# Patient Record
Sex: FEMALE | Race: WHITE
[De-identification: names, ages, dates, MRNs, and addresses within clinical notes are randomized per-mention and may not be internally consistent; named-entity substitution may affect disease eponyms.]

---

## 2017-03-28 ENCOUNTER — HOSPITAL ENCOUNTER (OUTPATIENT)
Dept: HOSPITAL 80 - FIMAGING | Age: 57
End: 2017-03-28
Attending: GENERAL ACUTE CARE HOSPITAL
Payer: COMMERCIAL

## 2017-03-28 DIAGNOSIS — D64.9: ICD-10-CM

## 2017-03-28 DIAGNOSIS — I95.9: ICD-10-CM

## 2017-03-28 DIAGNOSIS — R55: ICD-10-CM

## 2017-03-28 DIAGNOSIS — Z12.31: Primary | ICD-10-CM

## 2017-03-28 DIAGNOSIS — Z53.8: ICD-10-CM

## 2017-03-28 PROCEDURE — G0202 SCR MAMMO BI INCL CAD: HCPCS

## 2017-03-28 NOTE — PDGENHP
History and Physical





- Chief Complaint


 acute loss of consciousness





- History of Present Illness


 primary care provider:  Dr. Rodríguez





HPI:  57-year-old female presenting with acute loss of consciousness 

characterized as sudden, with precipitating associated lightheadedness and 

feeling of weakness while she was undergoing mammography.  The patient had not 

recently been repositioned and did not experience any trauma she was gently 

lowered to the floor by the mammography tach.  Loss of consciousness lasted 

only several seconds and she regained consciousness without any intervention.  

The onset was at approximately 1:30 p.m. on 3/28/2017.  Duration was only 

several seconds.  She did not experience any postictal symptoms, no nausea, no 

vomiting, patient reports that she ate breakfast on the morning of presentation 

without any incidence.  Although the patient reports that she has had similar 

episodes in the past, her  denies these.  Patient reports he had 

otherwise been feeling well on the day prior to this presentation and denies 

any other symptoms.





Of note, the patient's systolic blood pressure was 130 and her heart rate was 

in the 80s when vital signs were checked several minutes after the episode.





History Information


I have personally reviewed and updated: family history, medical history, social 

history, surgical history





- Past Medical History


CVA ( October of 2013 with MRI demonstrating basal ganglia and cerebellar 

infarct, residual deficits include dysarthria and poor insight into deficits), 

hyperlipidemia, pulmonary embolism (  Unknown date unknown treatment)


Additional medical history: Reportedly PEA arrest the patient does not 

elucidate.  Reports of anoxic encephalopathy.  Uterine cancer





- Surgical History


Additional surgical history: hysterectomy





- Family History


Additional family history: patient's mother lived to be 90





- Social History


Smoking Status: Never smoked


Alcohol Use: None


Drug Use: None


Additional social history: sedentary at baseline, splits time between [employer] and [employer], retired  at [employer]





Review of Systems


ROS: 10pt was reviewed & negative except for what was stated in HPI & below


Neurological: Reports: weakness, other ( loss of consciousness)





Physical Exam


Constitutional: no apparent distress, appears nourished, not in pain


Eyes: PERRL, anicteric sclera, EOMI


Ears, Nose, Mouth, Throat: moist mucous membranes, hearing normal, ears appear 

normal, no oral mucosal ulcers


Cardiovascular: regular rate and rhythym, no murmur, rub, or gallop, No carotid 

bruit, No edema


Respiratory: no respiratory distress, no rales or rhonchi, clear to auscultation


Gastrointestinal: normoactive bowel sounds, soft, non-tender abdomen, no 

palpable masses


Neurologic: AAOx3, sensation intact bilaterally, other ( notable dysarthria), 

No weakness ( motor strength 5/5 bilateral upper and lower extremities)


Psychiatric: interacting appropriately, not anxious, not encephalopathic, 

thought process linear





Assessment & Plan


Assessment: 





 57-year-old female experiencing acute syncopal episode, stat team called in 

mammography.


Plan: 





1. Syncope.  Acute, new problem this provider, further workup is suggested.  

The patient experienced a syncopal episode and experienced recurrence of 

symptoms with a systolic blood pressure of 78.  I suspect the patient is either 

orthostatic or experiencing some other specific etiology of her hypotension.  

That being said, the patient's systolic blood pressure was 130 with a heart 

rate of 80 on my physical exam of the patient, suggesting that her symptoms are 

mostly orthostatic and she may simply be hypovolemic.  That being said, the 

patient should receive formal evaluation in the emergency department and this 

has been recommended to the patient and her .


- heart monitor rhythm reviewed, demonstrates a normal sinus mechanism, 

personally interpreted


- reviewed outside records including 8/18/2014 SLP report by Traci Gonzalez, 

reports the patient has poor insight into her deficits and this may be somewhat 

influencing her present decision making


- most recent lab work from 1/7/2017 demonstrating normocytic anemia with 

slight iron deficiency notably saturation of 11%, TIBC 412, iron level 44, 

suggesting the patient may have a slow insidious GI bleed contributing to her 

anemia verses an oral iron deficient component


- if the patient does report to the emergency department, would recommend 

observation overnight after orthostatics have formally been performed, she has 

received IV fluids, she has received a formal EKG, and she has had lab work to 

ensure that she does not have any other insidious pathologies such as acute 

kidney injury, myocardial ischemia, worsening anemia, lactic acidosis


-if the patient and her  choose not to go to the emergency department, 

would recommend immediate follow-up at Dr. Rodríguez office on the afternoon of 

this event





It should be noted that this consultation was performed in the setting of 

responding to a stat team call while patient was in outpatient mammography.

## 2017-06-07 ENCOUNTER — HOSPITAL ENCOUNTER (OUTPATIENT)
Dept: HOSPITAL 80 - FIMAGING | Age: 57
End: 2017-06-07
Attending: SURGERY
Payer: COMMERCIAL

## 2017-06-07 DIAGNOSIS — C18.9: Primary | ICD-10-CM

## 2017-06-07 DIAGNOSIS — K80.20: ICD-10-CM

## 2017-06-07 DIAGNOSIS — Q63.1: ICD-10-CM

## 2017-06-14 ENCOUNTER — HOSPITAL ENCOUNTER (INPATIENT)
Dept: HOSPITAL 80 - F3N | Age: 57
LOS: 10 days | Discharge: HOME | DRG: 330 | End: 2017-06-24
Attending: SURGERY | Admitting: SURGERY
Payer: COMMERCIAL

## 2017-06-14 DIAGNOSIS — Z79.01: ICD-10-CM

## 2017-06-14 DIAGNOSIS — Z85.42: ICD-10-CM

## 2017-06-14 DIAGNOSIS — K91.89: ICD-10-CM

## 2017-06-14 DIAGNOSIS — D63.8: ICD-10-CM

## 2017-06-14 DIAGNOSIS — Z53.31: ICD-10-CM

## 2017-06-14 DIAGNOSIS — D62: ICD-10-CM

## 2017-06-14 DIAGNOSIS — C18.5: Primary | ICD-10-CM

## 2017-06-14 DIAGNOSIS — Z86.718: ICD-10-CM

## 2017-06-14 PROCEDURE — 0DTG0ZZ RESECTION OF LEFT LARGE INTESTINE, OPEN APPROACH: ICD-10-PCS | Performed by: SURGERY

## 2017-06-14 PROCEDURE — 0DJD8ZZ INSPECTION OF LOWER INTESTINAL TRACT, VIA NATURAL OR ARTIFICIAL OPENING ENDOSCOPIC: ICD-10-PCS | Performed by: SURGERY

## 2017-06-14 PROCEDURE — P9016 RBC LEUKOCYTES REDUCED: HCPCS

## 2017-06-14 RX ADMIN — SODIUM CHLORIDE, SODIUM LACTATE, POTASSIUM CHLORIDE, AND CALCIUM CHLORIDE SCH MLS: 600; 310; 30; 20 INJECTION, SOLUTION INTRAVENOUS at 16:03

## 2017-06-14 RX ADMIN — SODIUM CHLORIDE, SODIUM LACTATE, POTASSIUM CHLORIDE, AND CALCIUM CHLORIDE SCH MLS: 600; 310; 30; 20 INJECTION, SOLUTION INTRAVENOUS at 23:22

## 2017-06-14 NOTE — POSTOPPROG
Post Op Note


Date of Operation: 06/14/17


Surgeon: Rubio Schmidt


Assistant: none


Anesthesiologist: Denis


Anesthesia: GET(General Endotracheal)


Pre-op Diagnosis: Splenic flexure cancer


Post-op Diagnosis: same


Procedure: left colectomy


Findings: large splenic mass, air tight anastomosis


Inf/Abcess present in the surg proc area at time of surgery?: No


EBL: 


Complications: 





none


Specimen(s): 





left colon proximal end stapled

## 2017-06-14 NOTE — PDANEPAE
ANE History of Present Illness





colon ca





ANE Past Medical History





- Cardiovascular History


Hx Hypertension: No


Hx Arrhythmias: No


Hx Chest Pain: No


Hx Coronary Artery / Peripheral Vascular Disease: No


Hx CHF / Valvular Disease: No


Hx Palpitations: No





- Pulmonary History


Hx COPD: No


Hx Asthma/Reactive Airway Disease: No


Hx Recent Upper Respiratory Infection: No


Hx Oxygen in Use at Home: No





- Neurologic History


Hx Cerebrovascular Accident: Yes


Hx Seizures: No


Hx Dementia: No





- Endocrine History


Hx Diabetes: No





- Renal History


Hx Renal Disorders: No





- Liver History


Hx Hepatic Disorders: No





- Neurological & Psychiatric Hx


Hx Neurological and Psychiatric Disorders: No





- Cancer History


Hx Cancer: Yes





- Congenital Disorder History


Hx Congenital Disorders: No





- GI History


Hx Gastrointestinal Disorders: Yes





- Chronic Pain History


Chronic Pain: No





ANE Review of Systems





- Exercise capacity


METS (RN): 4 METS





- Systems


Neurological: Reports: other (cva/trach-speech impediment, otherwise health)


Hematologic/Lymphatic: Reports: blood clots (dvt/pe/cva)





ANE Patient History





- Allergies


Allergies/Adverse Reactions: 








No Known Allergies Allergy (Verified 06/13/17 17:12)


 








- Home Medications


Home Medications: 








Rivaroxaban [Xarelto] 20 mg PO DAILY18 06/12/17 [Last Taken 06/11/17 21:00]








- NPO status


NPO Since - Liquids (Date): 06/13/17


NPO Since - Liquids (Time): 23:30


NPO Since - Solids (Date): 06/13/17


NPO Since - Solids (Time): 08:00





- Anes Hx


Anes Hx: no prior problems





- Smoking Hx


Smoking Status: Never smoked





ANE Labs/Vital Signs





- Labs - CBC


RBC: 34





- Labs - BMP


Sodium: bmp reviewed and ok





- Vital Signs


Blood Pressure: 130/93


Heart Rate: 93


Respiratory Rate: 16


O2 Sat (%): 93


Height: 170.18 cm


Weight: 81.647 kg





ANE Physical Exam





- Airway


Neck exam: decreased ROM


Mallampati Score: Class 2


Mouth exam: normal dental/mouth exam





- Pulmonary


Pulmonary: no respiratory distress





- Cardiovascular


Cardiovascular: regular rate and rhythym





- ASA Status


ASA Status: II





ANE Anesthesia Plan


Anesthesia Plan: general endotracheal anesthesia

## 2017-06-14 NOTE — POSTANESTH
Post Anesthetic Evaluation


Cardiovascular Status: Normal, Stable


Respiratory Status: Normal, Stable


Level of Consciousness/Mental Status: Can Participate in Eval


Pain Control: Adequate, Prn Tx Ordered


Nausea/Vomiting Control: Adequate, Prn Tx Ordered


Complications Possibly Related to Anesthesia: None Noted

## 2017-06-15 LAB
% IMMATURE GRANULYOCYTES: 0.5 % (ref 0–1.1)
ABSOLUTE IMMATURE GRANULOCYTES: 0.06 10^3/UL (ref 0–0.1)
ABSOLUTE NRBC COUNT: 0 10^3/UL (ref 0–0.01)
ADD DIFF?: NO
ADD MORPH?: NO
ADD SCAN?: NO
ANION GAP SERPL CALC-SCNC: 5 MEQ/L (ref 8–16)
ATYPICAL LYMPHOCYTE FLAG: 0 (ref 0–99)
CALCIUM SERPL-MCNC: 8.4 MG/DL (ref 8.5–10.4)
CHLORIDE SERPL-SCNC: 107 MEQ/L (ref 97–110)
CO2 SERPL-SCNC: 24 MEQ/L (ref 22–31)
CREAT SERPL-MCNC: 0.8 MG/DL (ref 0.6–1)
ERYTHROCYTE [DISTWIDTH] IN BLOOD BY AUTOMATED COUNT: 16.3 % (ref 11.5–15.2)
FRAGMENT RBC FLAG: 20 (ref 0–99)
GFR SERPL CREATININE-BSD FRML MDRD: > 60 ML/MIN/{1.73_M2}
GLUCOSE SERPL-MCNC: 124 MG/DL (ref 70–100)
HCT VFR BLD CALC: 25.1 % (ref 38–47)
HCT VFR BLD CALC: 29 % (ref 38–47)
HGB BLD-MCNC: 7.8 G/DL (ref 12.6–16.3)
HGB BLD-MCNC: 9.3 G/DL (ref 12.6–16.3)
LEFT SHIFT FLG: 20 (ref 0–99)
LIPEMIA HEMOLYSIS FLAG: 80 (ref 0–99)
MCH RBC BLDCO QN: 22.2 PG (ref 27.9–34.1)
MCHC RBC AUTO-ENTMCNC: 31.1 G/DL (ref 32.4–36.7)
MCV RBC AUTO: 71.3 FL (ref 81.5–99.8)
NRBC-AUTO%: 0 % (ref 0–0.2)
PLATELET # BLD: 433 10^3/UL (ref 150–400)
PLATELET CLUMPS FLAG: 0 (ref 0–99)
PMV BLD AUTO: 9.3 FL (ref 8.7–11.7)
POTASSIUM SERPL-SCNC: 3.7 MEQ/L (ref 3.5–5.2)
RBC # BLD AUTO: 3.52 10^6/UL (ref 4.18–5.33)
SODIUM SERPL-SCNC: 136 MEQ/L (ref 134–144)
TROPONIN I SERPL-MCNC: < 0.012 NG/ML (ref 0–0.03)

## 2017-06-15 PROCEDURE — 30233N1 TRANSFUSION OF NONAUTOLOGOUS RED BLOOD CELLS INTO PERIPHERAL VEIN, PERCUTANEOUS APPROACH: ICD-10-PCS | Performed by: SURGERY

## 2017-06-15 RX ADMIN — SODIUM CHLORIDE, SODIUM LACTATE, POTASSIUM CHLORIDE, AND CALCIUM CHLORIDE SCH MLS: 600; 310; 30; 20 INJECTION, SOLUTION INTRAVENOUS at 20:52

## 2017-06-15 RX ADMIN — HEPARIN SODIUM SCH UNIT: 5000 INJECTION, SOLUTION INTRAVENOUS; SUBCUTANEOUS at 20:52

## 2017-06-15 RX ADMIN — SODIUM FERRIC GLUCONATE COMPLEX SCH MLS: 12.5 INJECTION INTRAVENOUS at 15:44

## 2017-06-15 RX ADMIN — HEPARIN SODIUM SCH UNIT: 5000 INJECTION, SOLUTION INTRAVENOUS; SUBCUTANEOUS at 15:44

## 2017-06-15 RX ADMIN — SODIUM CHLORIDE, SODIUM LACTATE, POTASSIUM CHLORIDE, AND CALCIUM CHLORIDE SCH MLS: 600; 310; 30; 20 INJECTION, SOLUTION INTRAVENOUS at 06:42

## 2017-06-15 RX ADMIN — HEPARIN SODIUM SCH UNIT: 5000 INJECTION, SOLUTION INTRAVENOUS; SUBCUTANEOUS at 06:41

## 2017-06-15 NOTE — GCON
[f rep st]



                                                                    CONSULTATION





INTERNAL MEDICINE CONSULTATION



DATE OF CONSULTATION:  06/14/2017



REFERRING PHYSICIAN:  Rubio Schmidt MD





REASON FOR CONSULTATION:  History of PE, chest pain.



HISTORY OF PRESENT ILLNESS:  This is a 57-year-old female who underwent a partial colectomy yesterda
y for a large splenic flexure mass that was adenocarcinoma.  This was found from routine screening c
olonoscopy.  She does have probable iron deficiency anemia.  She has a history of a massive PE resul
ting in cardiac arrest and a 3 week hospital stay in [place].  At that time, she was found to have a
 uterine mass and uterine cancer, and that was resected later on.  She has been on Xarelto for antic
oagulation.  This was discontinued several days prior to surgery.  This morning, she had some chest 
pain, but it is now resolved.  She is denying any shortness of breath at this time.  She has a littl
e bit of throat irritation.  She does also complain of abdominal distention.  No flatus today.



REVIEW OF SYSTEMS:  10-point review of systems obtained, other than stated above is negative.



PAST MEDICAL HISTORY:  

1.  Uterine cancer.

2.  History of PE and DVT.



PAST SURGICAL HISTORY:  Knee surgery, tracheostomy, PEG tube, hysterectomy.



FAMILY HISTORY:  Mother with hypertension.



SOCIAL HISTORY:  No smoking.  Occasional alcohol.



PHYSICAL EXAMINATION:  VITAL SIGNS:  Afebrile, blood pressure is 117/73, heart rate 80, oxygen satur
ation is at 97% on 1 L.  GENERAL:  Patient is well developed, no apparent distress.  HEENT:  Nonicte
cindy sclerae.  Extraocular movements intact.  Moist mucous membranes.  NECK:  Supple.  A little bit o
f fullness in the thyroid area.  LUNGS:  Good effort.  Clear to auscultation bilaterally.  CARDIOVAS
CULAR:  Regular rate and rhythm.  No murmurs, gallops.  ABDOMEN:  Distended.  Midline incision noted
.  Decreased bowel sounds.  Soft.  Mild tenderness.  EXTREMITIES:  No clubbing, cyanosis, or edema. 
 SKIN:  Without rash, warm, intact.  NEUROLOGIC:  Awake, alert, and oriented x3, moving all 4 extrem
ities equally.  PSYCH:  Normal mood and affect.



LABORATORIES:  White blood cell count is 12 hemoglobin 7.8 with a preoperative hemoglobin of 10.5.  
There is a microcytic anemia.  She had a ferritin of 6 in January of this year.  CEA is 3.  Troponin
 negative today.  



EKG personally reviewed and interpreted showed normal sinus rhythm, no ischemic changes.



ASSESSMENT:  This is a 57-year-old female who is status post partial colectomy due to colon cancer a
nd a previous history of massive pulmonary embolism and deep venous thrombosis.



PLAN:  

1.  Colon cancer.  This has been resected.  Further management per Surgery.

2.  Chest pain.  This has resolved, and EKG is negative.  Troponin is negative as well.  I think thi
s is unlikely to be a PE as her symptoms have resolved and she has not been off her anticoagulation 
that long.  We will continue to monitor.

3.  History of PE and DVT.  It looks like she has been started on subcu heparin today with a plan of
 starting Xarelto tonight.  I will discuss it with Dr. Schmidt but probably prefer maybe a day of fu
ll dose Lovenox prior as this is completely metabolized within 12 hours in case there is some bleedi
ng complication.  It does take longer for Xarelto it seems to come out of the system.  She does have
 normal creatinine.

4.  Iron deficiency anemia.  We will give a few doses of IV iron while she is here in the hospital. 
 It appears that she has been written for a little bit of blood.  



Thank you for this consultation.  Will follow with you.





Job #:  [id number]

## 2017-06-15 NOTE — CPEKG
Heart Rate: 80

RR Interval: 750

P-R Interval: 156

QRSD Interval: 100

QT Interval: 364

QTC Interval: 420

P Axis: 63

QRS Axis: 39

T Wave Axis: 19

EKG Severity - BORDERLINE ECG -

EKG Impression: SINUS RHYTHM

Electronically Signed By: Jamil Hector 15-Storm-2017 08:55:21

## 2017-06-15 NOTE — SOAPPROG
SOAP Progress Note


Assessment/Plan: 


Assessment/Plan:


POD#1 c/o chest pain this am.  EKG normal.  troponin nl.  Hx of embolus Heparin 

started, Xarelto ordered for 6/15


VSS


Hgb/Hct 7.8/24 from 10/31


Min OR blood loss


Abd distended likely post op acute blood loss anemia


2 units pRBC ordered.


Cont clear liq


Medicine consult


Will see later today


H/H after transfusion








06/15/17 09:11





Objective: 





 Vital Signs











Temp Pulse Resp BP Pulse Ox


 


 36.2 C   90   18   116/71   94 


 


 06/15/17 07:57  06/15/17 07:57  06/15/17 07:57  06/15/17 07:57  06/15/17 07:57








 Laboratory Results





 06/15/17 07:30 





 06/15/17 07:30 





 











 06/14/17 06/15/17 06/16/17





 05:59 05:59 05:59


 


Intake Total  2900 


 


Output Total  650 


 


Balance  2250 














ICD10 Worksheet


Patient Problems: 


 Problems











Problem Status Onset


 


Colon adenocarcinoma Acute  














- ICD10 Problem Qualifiers


(1) Colon adenocarcinoma

## 2017-06-16 LAB
% IMMATURE GRANULYOCYTES: 0.5 % (ref 0–1.1)
ABSOLUTE IMMATURE GRANULOCYTES: 0.06 10^3/UL (ref 0–0.1)
ABSOLUTE NRBC COUNT: 0 10^3/UL (ref 0–0.01)
ADD DIFF?: NO
ADD MORPH?: NO
ADD SCAN?: NO
ALBUMIN SERPL-MCNC: 2.4 G/DL (ref 3.5–5)
ALP SERPL-CCNC: 51 IU/L (ref 38–126)
ALT SERPL-CCNC: 25 IU/L (ref 9–52)
ANION GAP SERPL CALC-SCNC: 3 MEQ/L (ref 8–16)
AST SERPL-CCNC: 24 IU/L (ref 14–46)
ATYPICAL LYMPHOCYTE FLAG: 0 (ref 0–99)
BILIRUB SERPL-MCNC: 2 MG/DL (ref 0.1–1.4)
CALCIUM SERPL-MCNC: 8.2 MG/DL (ref 8.5–10.4)
CHLORIDE SERPL-SCNC: 107 MEQ/L (ref 97–110)
CO2 SERPL-SCNC: 24 MEQ/L (ref 22–31)
CREAT SERPL-MCNC: 0.7 MG/DL (ref 0.6–1)
ERYTHROCYTE [DISTWIDTH] IN BLOOD BY AUTOMATED COUNT: 17 % (ref 11.5–15.2)
FRAGMENT RBC FLAG: 0 (ref 0–99)
GFR SERPL CREATININE-BSD FRML MDRD: > 60 ML/MIN/{1.73_M2}
GLUCOSE SERPL-MCNC: 101 MG/DL (ref 70–100)
HCT VFR BLD CALC: 28.6 % (ref 38–47)
HGB BLD-MCNC: 9.1 G/DL (ref 12.6–16.3)
LEFT SHIFT FLG: 0 (ref 0–99)
LIPEMIA HEMOLYSIS FLAG: 80 (ref 0–99)
MCH RBC BLDCO QN: 23.5 PG (ref 27.9–34.1)
MCHC RBC AUTO-ENTMCNC: 31.8 G/DL (ref 32.4–36.7)
MCV RBC AUTO: 73.7 FL (ref 81.5–99.8)
NRBC-AUTO%: 0 % (ref 0–0.2)
PLATELET # BLD: 328 10^3/UL (ref 150–400)
PLATELET CLUMPS FLAG: 0 (ref 0–99)
PMV BLD AUTO: 9.1 FL (ref 8.7–11.7)
POTASSIUM SERPL-SCNC: 3.7 MEQ/L (ref 3.5–5.2)
PROT SERPL-MCNC: 4.4 G/DL (ref 6.3–8.2)
RBC # BLD AUTO: 3.88 10^6/UL (ref 4.18–5.33)
SODIUM SERPL-SCNC: 134 MEQ/L (ref 134–144)

## 2017-06-16 RX ADMIN — HEPARIN SODIUM SCH UNIT: 5000 INJECTION, SOLUTION INTRAVENOUS; SUBCUTANEOUS at 05:25

## 2017-06-16 RX ADMIN — RIVAROXABAN SCH MG: 20 TABLET, FILM COATED ORAL at 17:52

## 2017-06-16 RX ADMIN — SODIUM CHLORIDE, SODIUM LACTATE, POTASSIUM CHLORIDE, AND CALCIUM CHLORIDE SCH MLS: 600; 310; 30; 20 INJECTION, SOLUTION INTRAVENOUS at 14:47

## 2017-06-16 RX ADMIN — ACETAMINOPHEN PRN MG: 325 TABLET ORAL at 18:26

## 2017-06-16 RX ADMIN — HEPARIN SODIUM SCH UNIT: 5000 INJECTION, SOLUTION INTRAVENOUS; SUBCUTANEOUS at 13:41

## 2017-06-16 RX ADMIN — SODIUM FERRIC GLUCONATE COMPLEX SCH MLS: 12.5 INJECTION INTRAVENOUS at 08:55

## 2017-06-16 RX ADMIN — SODIUM CHLORIDE, SODIUM LACTATE, POTASSIUM CHLORIDE, AND CALCIUM CHLORIDE SCH MLS: 600; 310; 30; 20 INJECTION, SOLUTION INTRAVENOUS at 23:01

## 2017-06-16 NOTE — HOSPPROG
Hospitalist Progress Note


Assessment/Plan: 





* colon cancer status post resection


* Doing well


* Diet will be advanced


* Possibly home tomorrow





* chest pain


* Troponins are negative


* Chest pain is resolved


* Possibly musculoskeletal or GERD





* history of massive PE


* Restart Xarelto tonight





* iron deficiency anemia/acute blood loss anemia


* Hemoglobin stable


* Continue IV iron


Subjective: No new complaints.  Denies chest pain. Abdominal distention better.

  Passing gas and having some bowel movement


Objective: 


 Vital Signs











Temp Pulse Resp BP Pulse Ox


 


 37.5 C   79   16   111/65   92 


 


 06/16/17 07:46  06/16/17 07:46  06/16/17 07:46  06/16/17 07:46  06/16/17 07:46








 Laboratory Results





 06/16/17 04:53 





 06/16/17 04:53 





 











 06/15/17 06/16/17 06/17/17





 05:59 05:59 05:59


 


Intake Total 2900 1220 


 


Output Total 650 1075 


 


Balance 2250 145 








Discussed with surgery





- Physical Exam


Constitutional: no apparent distress, appears nourished, not in pain


Eyes: anicteric sclera, EOMI


Ears, Nose, Mouth, Throat: moist mucous membranes, hearing normal, ears appear 

normal


Cardiovascular: regular rate and rhythym, no murmur, rub, or gallop


Respiratory: no respiratory distress, no rales or rhonchi


Gastrointestinal: normoactive bowel sounds, other (Slightly distended soft 

nontender)


Skin: warm


Neurologic: AAOx3


Psychiatric: interacting appropriately, not anxious, not encephalopathic, 

thought process linear





ICD10 Worksheet


Patient Problems: 


 Problems











Problem Status Onset


 


Colon adenocarcinoma Acute

## 2017-06-16 NOTE — SOAPPROG
SOAP Progress Note


Assessment/Plan: 


Assessment/Plan:


POD#2 c/o chest pain yesterday.  It has since resolved.  EKG normal.  troponin 

nl.  Hx of embolus Heparin started, Xarelto ordered for 6/16


VSS


Bowel movement and flatus today





Regular rate and rhythm


Clear to auscultation bilaterally


Abdomen is soft nondistended incision clean dry and intact no signs of 

infection or herniation


Extremities without edema





Advance to low residue diet,.  Subq heparin and start Xarelto


Appreciate  Medicine consult


Anticipate 24-48 hour hospital stay








06/15/17 09:11





06/16/17 16:13





Objective: 





 Vital Signs











Temp Pulse Resp BP Pulse Ox


 


 37.1 C   82   16   138/76 H  91 L


 


 06/16/17 15:28  06/16/17 15:28  06/16/17 15:28  06/16/17 15:28  06/16/17 15:28








 Laboratory Results





 06/16/17 04:53 





 06/16/17 04:53 





 











 06/15/17 06/16/17 06/17/17





 05:59 05:59 05:59


 


Intake Total 2900 1220 300


 


Output Total 650 1075 700


 


Balance 2250 145 -400














ICD10 Worksheet


Patient Problems: 


 Problems











Problem Status Onset


 


Colon adenocarcinoma Acute  














- ICD10 Problem Qualifiers


(1) Colon adenocarcinoma

## 2017-06-17 RX ADMIN — SODIUM CHLORIDE, SODIUM LACTATE, POTASSIUM CHLORIDE, AND CALCIUM CHLORIDE SCH MLS: 600; 310; 30; 20 INJECTION, SOLUTION INTRAVENOUS at 06:11

## 2017-06-17 RX ADMIN — PSYLLIUM HUSK SCH EACH: 3.4 GRANULE ORAL at 10:31

## 2017-06-17 RX ADMIN — PROMETHAZINE HYDROCHLORIDE PRN MG: 25 INJECTION INTRAMUSCULAR; INTRAVENOUS at 18:42

## 2017-06-17 RX ADMIN — SODIUM FERRIC GLUCONATE COMPLEX SCH MLS: 12.5 INJECTION INTRAVENOUS at 10:26

## 2017-06-17 RX ADMIN — PROMETHAZINE HYDROCHLORIDE PRN MG: 25 INJECTION INTRAMUSCULAR; INTRAVENOUS at 21:19

## 2017-06-17 RX ADMIN — ONDANSETRON PRN MG: 2 SOLUTION INTRAMUSCULAR; INTRAVENOUS at 13:52

## 2017-06-17 RX ADMIN — RIVAROXABAN SCH MG: 20 TABLET, FILM COATED ORAL at 18:42

## 2017-06-17 NOTE — HOSPPROG
Hospitalist Progress Note


Assessment/Plan: 





* colon cancer status post resection


* Doing well


*  diet advancing





* chest pain


* Troponins are negative


* Chest pain is resolved


* Possibly musculoskeletal or GERD





* history of massive PE


* Restart Xarelto tonight





* iron deficiency anemia/acute blood loss anemia


* Hemoglobin stable


* Continue IV iron for 3 doses total


Subjective: no new complaints.  Beginning to tolerate diet


Objective: 


 Vital Signs











Temp Pulse Resp BP Pulse Ox


 


 36.6 C   61   18   103/66   96 


 


 06/17/17 09:01  06/17/17 09:01  06/17/17 09:01  06/17/17 09:01  06/17/17 09:01








 Laboratory Results





 06/16/17 04:53 





 06/16/17 04:53 





 











 06/16/17 06/17/17 06/18/17





 05:59 05:59 05:59


 


Intake Total 1220 600 


 


Output Total 1075 2700 350


 


Balance 145 -2100 -350














- Physical Exam


Constitutional: no apparent distress, appears nourished, not in pain


Eyes: anicteric sclera, EOMI


Ears, Nose, Mouth, Throat: moist mucous membranes, hearing normal


Respiratory: no respiratory distress


Gastrointestinal: normoactive bowel sounds, soft, non-tender abdomen, other ( 

slight distention)


Neurologic: AAOx3


Psychiatric: interacting appropriately, not anxious, not encephalopathic, 

thought process linear





ICD10 Worksheet


Patient Problems: 


 Problems











Problem Status Onset


 


Colon adenocarcinoma Acute

## 2017-06-17 NOTE — SOAPPROG
SOAP Progress Note


Assessment/Plan: 


Assessment/Plan:


POD#3 status post left colectomy for obstructing colon cancer





 





 06/16/17 04:53 





 06/16/17 04:53 





 











Patient ABO/Rh  A POSITIVE   06/14/17  08:10    


 


Total Bilirubin  2.0 mg/dL (0.1-1.4)  H  06/16/17  04:53    


 


AST  24 IU/L (14-46)   06/16/17  04:53    


 


ALT  25 IU/L (9-52)   06/16/17  04:53    











No complaints tylenol for pain


Tolerating diet


liquid stool


VSS





Regular rate and rhythm


Clear to auscultation bilaterally


Abdomen is soft nondistended incision clean dry and intact no signs of 

infection or herniation


Extremities without edema





Advanced to low residue diet. 


Xarelto


Appreciate  Medicine consult


Fiber in diet 


repeat CBC 6/18


Anticipate 24-48 hour hospital stay








06/15/17 09:11





06/16/17 16:13





06/17/17 07:28





Objective: 





 Vital Signs











Temp Pulse Resp BP Pulse Ox


 


 36.9 C   70   16   123/76 H  93 


 


 06/17/17 04:00  06/17/17 04:00  06/17/17 04:00  06/17/17 04:00  06/17/17 04:00








 Laboratory Results





 06/16/17 04:53 





 06/16/17 04:53 





 











 06/16/17 06/17/17 06/18/17





 05:59 05:59 05:59


 


Intake Total 1220 600 


 


Output Total 1075 2700 350


 


Balance 145 -2100 -350














ICD10 Worksheet


Patient Problems: 


 Problems











Problem Status Onset


 


Colon adenocarcinoma Acute  














- ICD10 Problem Qualifiers


(1) Colon adenocarcinoma

## 2017-06-18 LAB
ALBUMIN SERPL-MCNC: 2.7 G/DL (ref 3.5–5)
ALP SERPL-CCNC: 56 IU/L (ref 38–126)
ALT SERPL-CCNC: 25 IU/L (ref 9–52)
ANION GAP SERPL CALC-SCNC: 11 MEQ/L (ref 8–16)
AST SERPL-CCNC: 17 IU/L (ref 14–46)
BILIRUB SERPL-MCNC: 1.1 MG/DL (ref 0.1–1.4)
CALCIUM SERPL-MCNC: 9.1 MG/DL (ref 8.5–10.4)
CHLORIDE SERPL-SCNC: 108 MEQ/L (ref 97–110)
CO2 SERPL-SCNC: 18 MEQ/L (ref 22–31)
CREAT SERPL-MCNC: 0.7 MG/DL (ref 0.6–1)
ERYTHROCYTE [DISTWIDTH] IN BLOOD BY AUTOMATED COUNT: 17.3 % (ref 11.5–15.2)
GFR SERPL CREATININE-BSD FRML MDRD: > 60 ML/MIN/{1.73_M2}
GLUCOSE SERPL-MCNC: 101 MG/DL (ref 70–100)
HCT VFR BLD CALC: 35.6 % (ref 38–47)
HGB BLD-MCNC: 10.9 G/DL (ref 12.6–16.3)
LIPEMIA HEMOLYSIS FLAG: 80 (ref 0–99)
MAGNESIUM SERPL-MCNC: 2 MG/DL (ref 1.6–2.3)
MCH RBC BLDCO QN: 22.9 PG (ref 27.9–34.1)
MCHC RBC AUTO-ENTMCNC: 30.6 G/DL (ref 32.4–36.7)
MCV RBC AUTO: 74.9 FL (ref 81.5–99.8)
PLATELET # BLD: 523 10^3/UL (ref 150–400)
PLATELET CLUMPS FLAG: 0 (ref 0–99)
POTASSIUM SERPL-SCNC: 4 MEQ/L (ref 3.5–5.2)
PROT SERPL-MCNC: 5.2 G/DL (ref 6.3–8.2)
RBC # BLD AUTO: 4.75 10^6/UL (ref 4.18–5.33)
SODIUM SERPL-SCNC: 137 MEQ/L (ref 134–144)

## 2017-06-18 RX ADMIN — RIVAROXABAN SCH MG: 20 TABLET, FILM COATED ORAL at 18:21

## 2017-06-18 RX ADMIN — ONDANSETRON PRN MG: 2 SOLUTION INTRAMUSCULAR; INTRAVENOUS at 11:01

## 2017-06-18 RX ADMIN — Medication SCH MLS: at 21:02

## 2017-06-18 RX ADMIN — PROMETHAZINE HYDROCHLORIDE PRN MG: 25 INJECTION INTRAMUSCULAR; INTRAVENOUS at 07:14

## 2017-06-18 RX ADMIN — Medication SCH MLS: at 11:01

## 2017-06-18 RX ADMIN — PROMETHAZINE HYDROCHLORIDE PRN MG: 25 INJECTION INTRAMUSCULAR; INTRAVENOUS at 04:53

## 2017-06-18 RX ADMIN — PROMETHAZINE HYDROCHLORIDE PRN MG: 25 INJECTION INTRAMUSCULAR; INTRAVENOUS at 14:50

## 2017-06-18 RX ADMIN — DEXTROSE MONOHYDRATE, SODIUM CHLORIDE, AND POTASSIUM CHLORIDE SCH MLS: 50; 4.5; 1.49 INJECTION, SOLUTION INTRAVENOUS at 18:23

## 2017-06-18 RX ADMIN — PSYLLIUM HUSK SCH: 3.4 GRANULE ORAL at 08:09

## 2017-06-18 NOTE — HOSPPROG
Hospitalist Progress Note


Assessment/Plan: 





* colon cancer status post resection


*  persistent nausea





* chest pain


* Troponins are negative


* Chest pain is resolved


* Possibly musculoskeletal or GERD





* GERD


*  we will start Pepcid





* vomiting


*  is passing gas


*  abdomen is benign


*  perhaps persistent effects of anesthesia


*  try premedicating with anti nausea medicine





* history of massive PE


*  continue Xarelto





* iron deficiency anemia/acute blood loss anemia


*  status post 3 doses of IV iron


Subjective: Had vomiting yesterday.  Is still nauseous.  Is complaining of some 

heartburn.


Objective: 


 Vital Signs











Temp Pulse Resp BP Pulse Ox


 


 37.0 C   82   14   112/80   96 


 


 06/18/17 08:00  06/18/17 08:00  06/18/17 08:00  06/18/17 08:00  06/18/17 08:00








 Laboratory Results





 06/18/17 05:00 





 06/16/17 04:53 





 











 06/17/17 06/18/17 06/19/17





 05:59 05:59 05:59


 


Intake Total 600 450 


 


Output Total 2700 1250 


 


Balance -2100 -800 














- Physical Exam


Constitutional: no apparent distress, appears nourished, not in pain


Eyes: anicteric sclera


Ears, Nose, Mouth, Throat: hearing normal


Cardiovascular: regular rate and rhythym, no murmur, rub, or gallop


Respiratory: no respiratory distress, no rales or rhonchi, clear to auscultation


Gastrointestinal: other (  Distended soft.  Nontender.  Decreased bowel sounds)


Skin: warm


Neurologic: AAOx3


Psychiatric: interacting appropriately, not anxious, not encephalopathic, 

thought process linear





ICD10 Worksheet


Patient Problems: 


 Problems











Problem Status Onset


 


Colon adenocarcinoma Acute

## 2017-06-18 NOTE — SOAPPROG
SOAP Progress Note


Assessment/Plan: 


Assessment/Plan:


POD#3 status post left colectomy for obstructing colon cancer





 





 06/16/17 04:53 





 06/16/17 04:53 





 











Patient ABO/Rh  A POSITIVE   06/14/17  08:10    


 


Total Bilirubin  2.0 mg/dL (0.1-1.4)  H  06/16/17  04:53    


 


AST  24 IU/L (14-46)   06/16/17  04:53    


 


ALT  25 IU/L (9-52)   06/16/17  04:53    











No complaints tylenol for pain


Vomited overnight


Less liquid stool


VSS





Regular rate and rhythm


Clear to auscultation bilaterally


Abdomen is soft mildly distended incision clean dry and intact no signs of 

infection or herniation


Extremities without edema





Back down to sips


Xarelto


Appreciate  Medicine consult


May have leak although WBC normal.


Increased platelet count/bicarb 18


Will reevaluate early 6/19


repeat cbc/bmp in am


start IVF (bolus to catch up)


Anticipate 24-48 hour hospital stay











06/18/17 17:54





Objective: 





 Vital Signs











Temp Pulse Resp BP Pulse Ox


 


 37.1 C   99   18   118/80   92 


 


 06/18/17 16:00  06/18/17 16:00  06/18/17 16:00  06/18/17 16:00  06/18/17 16:00








 Laboratory Results





 06/18/17 05:00 





 06/18/17 16:15 





 











 06/17/17 06/18/17 06/19/17





 05:59 05:59 05:59


 


Intake Total 600 450 


 


Output Total 2700 1250 1000


 


Balance -2100 -800 -1000














ICD10 Worksheet


Patient Problems: 


 Problems











Problem Status Onset


 


Colon adenocarcinoma Acute  














- ICD10 Problem Qualifiers


(1) Colon adenocarcinoma

## 2017-06-19 LAB
% IMMATURE GRANULYOCYTES: 1 % (ref 0–1.1)
ABSOLUTE IMMATURE GRANULOCYTES: 0.11 10^3/UL (ref 0–0.1)
ABSOLUTE NRBC COUNT: 0 10^3/UL (ref 0–0.01)
ADD DIFF?: NO
ADD MORPH?: NO
ADD SCAN?: NO
ALBUMIN SERPL-MCNC: 2.3 G/DL (ref 3.5–5)
ALP SERPL-CCNC: 49 IU/L (ref 38–126)
ALT SERPL-CCNC: 25 IU/L (ref 9–52)
ANION GAP SERPL CALC-SCNC: 9 MEQ/L (ref 8–16)
AST SERPL-CCNC: 14 IU/L (ref 14–46)
ATYPICAL LYMPHOCYTE FLAG: 10 (ref 0–99)
BILIRUB SERPL-MCNC: 0.9 MG/DL (ref 0.1–1.4)
CALCIUM SERPL-MCNC: 8.7 MG/DL (ref 8.5–10.4)
CHLORIDE SERPL-SCNC: 109 MEQ/L (ref 97–110)
CO2 SERPL-SCNC: 18 MEQ/L (ref 22–31)
CREAT SERPL-MCNC: 0.7 MG/DL (ref 0.6–1)
ERYTHROCYTE [DISTWIDTH] IN BLOOD BY AUTOMATED COUNT: 18.5 % (ref 11.5–15.2)
FRAGMENT RBC FLAG: 20 (ref 0–99)
GFR SERPL CREATININE-BSD FRML MDRD: > 60 ML/MIN/{1.73_M2}
GLUCOSE SERPL-MCNC: 140 MG/DL (ref 70–100)
HCT VFR BLD CALC: 37.7 % (ref 38–47)
HGB BLD-MCNC: 11.6 G/DL (ref 12.6–16.3)
LEFT SHIFT FLG: 10 (ref 0–99)
LIPEMIA HEMOLYSIS FLAG: 80 (ref 0–99)
MCH RBC BLDCO QN: 23.2 PG (ref 27.9–34.1)
MCHC RBC AUTO-ENTMCNC: 30.8 G/DL (ref 32.4–36.7)
MCV RBC AUTO: 75.2 FL (ref 81.5–99.8)
NRBC-AUTO%: 0 % (ref 0–0.2)
PLATELET # BLD: 551 10^3/UL (ref 150–400)
PLATELET CLUMPS FLAG: 0 (ref 0–99)
PMV BLD AUTO: 9 FL (ref 8.7–11.7)
POTASSIUM SERPL-SCNC: 3.8 MEQ/L (ref 3.5–5.2)
PROT SERPL-MCNC: 4.7 G/DL (ref 6.3–8.2)
RBC # BLD AUTO: 5.01 10^6/UL (ref 4.18–5.33)
SODIUM SERPL-SCNC: 136 MEQ/L (ref 134–144)

## 2017-06-19 RX ADMIN — DEXTROSE MONOHYDRATE, SODIUM CHLORIDE, AND POTASSIUM CHLORIDE SCH MLS: 50; 4.5; 1.49 INJECTION, SOLUTION INTRAVENOUS at 04:31

## 2017-06-19 RX ADMIN — Medication SCH MLS: at 07:59

## 2017-06-19 RX ADMIN — PSYLLIUM HUSK SCH: 3.4 GRANULE ORAL at 08:59

## 2017-06-19 RX ADMIN — Medication SCH MLS: at 21:06

## 2017-06-19 RX ADMIN — PROMETHAZINE HYDROCHLORIDE PRN MG: 25 INJECTION INTRAMUSCULAR; INTRAVENOUS at 04:30

## 2017-06-19 RX ADMIN — DEXTROSE MONOHYDRATE, SODIUM CHLORIDE, AND POTASSIUM CHLORIDE SCH MLS: 50; 4.5; 1.49 INJECTION, SOLUTION INTRAVENOUS at 16:05

## 2017-06-19 RX ADMIN — ONDANSETRON PRN MG: 2 SOLUTION INTRAMUSCULAR; INTRAVENOUS at 02:05

## 2017-06-19 RX ADMIN — ONDANSETRON PRN MG: 2 SOLUTION INTRAMUSCULAR; INTRAVENOUS at 07:54

## 2017-06-19 RX ADMIN — PSYLLIUM HUSK SCH EACH: 3.4 GRANULE ORAL at 08:00

## 2017-06-19 NOTE — HOSPPROG
Hospitalist Progress Note


Assessment/Plan: 





* colon cancer status post resection


*  persistent nausea and abdominal distention but no pain


* Surgeries has mentioned possibility of leak although not having any pain.


* Will check lactate and procalcitonin


* Hold Xarelto





* history of massive PE in setting of previous uterine cancer several years ago


*  Hold Xarelto


*  Can start heparin or Lovenox tomorrow depending on plan





* iron deficiency anemia/acute blood loss anemia


*  status post 3 doses of IV iron





* nutrition


* We are now 5 days out from surgery with no indication of when she will begin 

p.o. nutrition


* Consider TPN





* chest pain several days ago


* Troponins are negative


* Chest pain is resolved


* Possibly musculoskeletal or GERD





* patient is high risk


Subjective: Still not feeling well.  Nauseous some vomiting.  No abdominal pain 

though.  Feeling dizzy when getting up


Objective: 


 Vital Signs











Temp Pulse Resp BP Pulse Ox


 


 37.0 C   98   22 H  138/98 H  92 


 


 06/19/17 08:00  06/19/17 08:00  06/19/17 08:00  06/19/17 08:00  06/19/17 08:00








 Laboratory Results





 06/19/17 04:18 





 06/19/17 04:18 





 











 06/18/17 06/19/17 06/20/17





 05:59 05:59 05:59


 


Intake Total 450 951 


 


Output Total 1250 1600 


 


Balance -800 649 














- Physical Exam


Constitutional: no apparent distress, appears nourished, not in pain


Eyes: anicteric sclera, EOMI


Ears, Nose, Mouth, Throat: moist mucous membranes, hearing normal


Cardiovascular: regular rate and rhythym, no murmur, rub, or gallop


Respiratory: no respiratory distress, no rales or rhonchi, clear to auscultation


Gastrointestinal: other (Decreased bowel sounds distended but soft and nontender

)


Skin: warm


Neurologic: AAOx3


Psychiatric: interacting appropriately, not anxious, not encephalopathic, 

thought process linear





ICD10 Worksheet


Patient Problems: 


 Problems











Problem Status Onset


 


Colon adenocarcinoma Acute

## 2017-06-19 NOTE — SOAPPROG
SOAP Progress Note


Assessment/Plan: 


Assessment/Plan:


POD#3 status post left colectomy for obstructing colon cancer





 





 06/16/17 04:53 





 06/16/17 04:53 





 











Patient ABO/Rh  A POSITIVE   06/14/17  08:10    


 


Total Bilirubin  2.0 mg/dL (0.1-1.4)  H  06/16/17  04:53    


 


AST  24 IU/L (14-46)   06/16/17  04:53    


 


ALT  25 IU/L (9-52)   06/16/17  04:53    











No complaints tylenol for pain


Vomited overnight


Less liquid stool


VSS





Regular rate and rhythm


Clear to auscultation bilaterally


Abdomen is soft mildly distended incision clean dry and intact no signs of 

infection or herniation


Extremities without edema





Back down to sips


Xarelto held


Appreciate  Medicine consult


May have leak although WBC normal.


Increased platelet count/bicarb 18


CT scan today does not demonstrate a leak or free air


Some ascitic fluid likely postoperative


Ileus.  Continue to monitor for now.  Encourage ambulation








06/18/17 17:54





06/19/17 17:44





Objective: 





 Vital Signs











Temp Pulse Resp BP Pulse Ox


 


 36.9 C   87   24 H  143/88 H  92 


 


 06/19/17 15:05  06/19/17 15:05  06/19/17 15:05  06/19/17 15:05  06/19/17 15:05








 Laboratory Results





 06/19/17 04:18 





 06/19/17 04:18 





 











 06/18/17 06/19/17 06/20/17





 05:59 05:59 05:59


 


Intake Total 450 951 


 


Output Total 1250 1600 


 


Balance -633 -642 














ICD10 Worksheet


Patient Problems: 


 Problems











Problem Status Onset


 


Colon adenocarcinoma Acute  














- ICD10 Problem Qualifiers


(1) Colon adenocarcinoma

## 2017-06-20 LAB
% IMMATURE GRANULYOCYTES: 0.6 % (ref 0–1.1)
ABSOLUTE IMMATURE GRANULOCYTES: 0.05 10^3/UL (ref 0–0.1)
ABSOLUTE NRBC COUNT: 0 10^3/UL (ref 0–0.01)
ADD DIFF?: NO
ADD MORPH?: NO
ADD SCAN?: NO
ANION GAP SERPL CALC-SCNC: 5 MEQ/L (ref 8–16)
ATYPICAL LYMPHOCYTE FLAG: 30 (ref 0–99)
CALCIUM SERPL-MCNC: 8.2 MG/DL (ref 8.5–10.4)
CHLORIDE SERPL-SCNC: 109 MEQ/L (ref 97–110)
CO2 SERPL-SCNC: 23 MEQ/L (ref 22–31)
CREAT SERPL-MCNC: 0.7 MG/DL (ref 0.6–1)
ERYTHROCYTE [DISTWIDTH] IN BLOOD BY AUTOMATED COUNT: 18.5 % (ref 11.5–15.2)
FRAGMENT RBC FLAG: 20 (ref 0–99)
GFR SERPL CREATININE-BSD FRML MDRD: > 60 ML/MIN/{1.73_M2}
GLUCOSE SERPL-MCNC: 109 MG/DL (ref 70–100)
HCT VFR BLD CALC: 32.2 % (ref 38–47)
HGB BLD-MCNC: 9.9 G/DL (ref 12.6–16.3)
LEFT SHIFT FLG: 0 (ref 0–99)
LIPEMIA HEMOLYSIS FLAG: 80 (ref 0–99)
MCH RBC BLDCO QN: 23.2 PG (ref 27.9–34.1)
MCHC RBC AUTO-ENTMCNC: 30.7 G/DL (ref 32.4–36.7)
MCV RBC AUTO: 75.4 FL (ref 81.5–99.8)
NRBC-AUTO%: 0 % (ref 0–0.2)
PLATELET # BLD: 442 10^3/UL (ref 150–400)
PLATELET CLUMPS FLAG: 0 (ref 0–99)
PMV BLD AUTO: 8.7 FL (ref 8.7–11.7)
POTASSIUM SERPL-SCNC: 3.5 MEQ/L (ref 3.5–5.2)
RBC # BLD AUTO: 4.27 10^6/UL (ref 4.18–5.33)
SODIUM SERPL-SCNC: 137 MEQ/L (ref 134–144)

## 2017-06-20 RX ADMIN — DEXTROSE MONOHYDRATE, SODIUM CHLORIDE, AND POTASSIUM CHLORIDE SCH MLS: 50; 4.5; 1.49 INJECTION, SOLUTION INTRAVENOUS at 13:21

## 2017-06-20 RX ADMIN — Medication SCH MLS: at 20:16

## 2017-06-20 RX ADMIN — Medication SCH MLS: at 07:31

## 2017-06-20 RX ADMIN — ACETAMINOPHEN PRN MG: 325 TABLET ORAL at 17:48

## 2017-06-20 RX ADMIN — RIVAROXABAN SCH MG: 20 TABLET, FILM COATED ORAL at 17:47

## 2017-06-20 RX ADMIN — PSYLLIUM HUSK SCH: 3.4 GRANULE ORAL at 09:42

## 2017-06-20 RX ADMIN — DEXTROSE MONOHYDRATE, SODIUM CHLORIDE, AND POTASSIUM CHLORIDE SCH MLS: 50; 4.5; 1.49 INJECTION, SOLUTION INTRAVENOUS at 23:46

## 2017-06-20 RX ADMIN — DEXTROSE MONOHYDRATE, SODIUM CHLORIDE, AND POTASSIUM CHLORIDE SCH MLS: 50; 4.5; 1.49 INJECTION, SOLUTION INTRAVENOUS at 02:20

## 2017-06-20 RX ADMIN — DEXTROSE MONOHYDRATE, SODIUM CHLORIDE, AND POTASSIUM CHLORIDE SCH MLS: 50; 4.5; 1.49 INJECTION, SOLUTION INTRAVENOUS at 16:45

## 2017-06-20 NOTE — SOAPPROG
SOAP Progress Note


Assessment/Plan: 


Assessment/Plan:


POD#3 status post left colectomy for obstructing colon cancer





 





 06/16/17 04:53 





 06/16/17 04:53 





 











Patient ABO/Rh  A POSITIVE   06/14/17  08:10    


 


Total Bilirubin  2.0 mg/dL (0.1-1.4)  H  06/16/17  04:53    


 


AST  24 IU/L (14-46)   06/16/17  04:53    


 


ALT  25 IU/L (9-52)   06/16/17  04:53    











No complaints tylenol for pain


No more nausea/emesis


Liquid stool


VSS





Regular rate and rhythm


Clear to auscultation bilaterally


Abdomen is soft mildly distended incision clean dry and intact no signs of 

infection or herniation


Extremities without edema








Xarelto held will restart today


Appreciate  Medicine consult


May have leak although WBC normal.


Increased platelet count/bicarb 18


CT scan yesterday did not demonstrate a leak or free air


Some ascitic fluid likely postoperative


Ileus resolving slowly.  


Clear liq diet


Continue to monitor for now.  Encourage ambulation








06/18/17 17:54





06/19/17 17:44





06/20/17 16:27





Objective: 





 Vital Signs











Temp Pulse Resp BP Pulse Ox


 


 36.9 C   81   18   122/65 H  91 L


 


 06/20/17 12:00  06/20/17 12:00  06/20/17 12:00  06/20/17 12:00  06/20/17 12:00








 Laboratory Results





 06/20/17 04:40 





 06/20/17 04:40 





 











 06/19/17 06/20/17 06/21/17





 05:59 05:59 05:59


 


Intake Total 951 2068 


 


Output Total 1600  600


 


Balance -649 2068 -600














ICD10 Worksheet


Patient Problems: 


 Problems











Problem Status Onset


 


Colon adenocarcinoma Acute  














- ICD10 Problem Qualifiers


(1) Colon adenocarcinoma

## 2017-06-20 NOTE — HOSPPROG
Hospitalist Progress Note


Assessment/Plan: 





* colon cancer status post resection POD #6


* persistent nausea and abdominal distention but no pain


* Surgery has mentioned possibility of leak although not having any pain and CT 

did not show a leak


* lactate normal.  PCT not elevated.





* history of massive PE in setting of previous uterine cancer several years ago


*  Pt refused Lovenox, will resume Xarelto, ok with surgery





* iron deficiency anemia/acute blood loss anemia


*  status post 3 doses of IV iron





* nutrition


* We are now 6 days out from surgery, plans to start clears today





* chest pain several days ago


* Troponins are negative


* Chest pain is resolved


* Possibly musculoskeletal or GERD





* patient is high risk


Subjective: Pt denies pain.  She feels she wants to get up to have a BM.  No 

fevers.  No e/o active bleeding.  No N/V.


Objective: 


 Vital Signs











Temp Pulse Resp BP Pulse Ox


 


 36.7 C   77   18   135/85 H  94 


 


 06/20/17 07:26  06/20/17 07:26  06/20/17 07:26  06/20/17 07:26  06/20/17 07:26








 Laboratory Results





 06/20/17 04:40 





 06/20/17 04:40 





 











 06/19/17 06/20/17 06/21/17





 05:59 05:59 05:59


 


Intake Total 951 2068 


 


Output Total 1600  


 


Balance -649 2068 














- Physical Exam


Constitutional: no apparent distress


Eyes: PERRL


Ears, Nose, Mouth, Throat: moist mucous membranes


Cardiovascular: regular rate and rhythym


Respiratory: no respiratory distress, clear to auscultation


Gastrointestinal: other (soft, moderate distention, non-tender, no r/r/g, +

hypoactive BS)


Skin: warm


Musculoskeletal: full muscle strength


Neurologic: AAOx3


Psychiatric: anxious





ICD10 Worksheet


Patient Problems: 


 Problems











Problem Status Onset


 


Colon adenocarcinoma Acute

## 2017-06-21 LAB
ERYTHROCYTE [DISTWIDTH] IN BLOOD BY AUTOMATED COUNT: 18.9 % (ref 11.5–15.2)
HCT VFR BLD CALC: 31.4 % (ref 38–47)
HGB BLD-MCNC: 9.8 G/DL (ref 12.6–16.3)
LIPEMIA HEMOLYSIS FLAG: 80 (ref 0–99)
MCH RBC BLDCO QN: 23.6 PG (ref 27.9–34.1)
MCHC RBC AUTO-ENTMCNC: 31.2 G/DL (ref 32.4–36.7)
MCV RBC AUTO: 75.5 FL (ref 81.5–99.8)
PLATELET # BLD: 434 10^3/UL (ref 150–400)
RBC # BLD AUTO: 4.16 10^6/UL (ref 4.18–5.33)

## 2017-06-21 RX ADMIN — PSYLLIUM HUSK SCH: 3.4 GRANULE ORAL at 08:39

## 2017-06-21 RX ADMIN — Medication SCH MLS: at 08:37

## 2017-06-21 RX ADMIN — RIVAROXABAN SCH MG: 20 TABLET, FILM COATED ORAL at 18:29

## 2017-06-21 RX ADMIN — Medication SCH MLS: at 22:04

## 2017-06-21 NOTE — HOSPPROG
Hospitalist Progress Note


Assessment/Plan: 





* colon cancer status post resection POD #7.  Path confirms invasive 

adenocarcinoma.  Surgery to discuss path results.  Will need oncology consult.  

Less distention today.  Moving bowels, which are reportedly watery.  Cont to 

advance care.





* ?watery stool - check c diff





* ileus - improving.  encouraged ambulation.





* history of massive PE in setting of previous uterine cancer several years ago


*  cont xarelto





* iron deficiency anemia/acute blood loss anemia


*  status post 3 doses of IV iron





* nutrition


* taking clears, advance diet as tolerated





* chest pain several days ago


* Troponins are negative


* Chest pain is resolved


* Possibly musculoskeletal or GERD





* dispo - cont inpt





* full code





Subjective: Pt feels well.  Denies pain.  +BM, which was diarrhea.  No fevers.  

Ambulating.  Tolerating small amts of clears.


Objective: 


 Vital Signs











Temp Pulse Resp BP Pulse Ox


 


 36.7 C   88   16   127/85 H  91 L


 


 06/21/17 11:17  06/21/17 11:17  06/21/17 11:17  06/21/17 11:17  06/21/17 11:17








 Laboratory Results





 06/21/17 04:39 





 06/20/17 04:40 





 











 06/20/17 06/21/17 06/22/17





 05:59 05:59 05:59


 


Intake Total 2068 2820 


 


Output Total  1600 900


 


Balance 2068 1220 -900














- Physical Exam


Constitutional: no apparent distress


Eyes: PERRL


Ears, Nose, Mouth, Throat: moist mucous membranes


Cardiovascular: regular rate and rhythym


Respiratory: no respiratory distress


Gastrointestinal: normoactive bowel sounds, other (mild distention (improved 

from yesterday), non-tender, no r/r/g, +BS)


Skin: warm


Neurologic: AAOx3


Psychiatric: interacting appropriately





ICD10 Worksheet


Patient Problems: 


 Problems











Problem Status Onset


 


Colon adenocarcinoma Acute

## 2017-06-21 NOTE — SOAPPROG
SOAP Progress Note


Assessment/Plan: 


Assessment:


POD 4 s/p left colectomy for obstructing colon can. Patient resting in bed, 

ambulating without difficulty. Pain controlled. + BM, tolerating clear liquids. 

Patient hesitant to advance diet. /83, HR 80s, afeb, 93% 1.5L NC, WBC 6.4

, H/H 9.8/ 31.4, plt 434, Path: colonic adenocarcinoma, invasive 11cm max 

dimension, negative lymph nodes (not discussed with patient as of yet), pT4b. A&

O x 3, abd: soft, distended, non tender Incision- clean, dry, intact Plan: Dr. Schmidt to come back and discuss pathology results with patient and  at 

bedside. D/C IV fluids, continue oral clear liquids for today. Encouraged 

ambulation. Home soon. 























Plan:





06/21/17 09:34





Objective: 





 Vital Signs











Temp Pulse Resp BP Pulse Ox


 


 36.9 C   93   20   116/83 H  93 


 


 06/21/17 07:15  06/21/17 07:15  06/21/17 07:15  06/21/17 07:15  06/21/17 07:15








 Laboratory Results





 06/21/17 04:39 





 06/20/17 04:40 





 











 06/20/17 06/21/17 06/22/17





 05:59 05:59 05:59


 


Intake Total 2068 2820 


 


Output Total  1600 900


 


Balance 2068 1220 -900














ICD10 Worksheet


Patient Problems: 


 Problems











Problem Status Onset


 


Colon adenocarcinoma Acute

## 2017-06-22 RX ADMIN — RIVAROXABAN SCH MG: 20 TABLET, FILM COATED ORAL at 18:01

## 2017-06-22 RX ADMIN — PSYLLIUM HUSK SCH: 3.4 GRANULE ORAL at 08:20

## 2017-06-22 RX ADMIN — Medication SCH MLS: at 20:05

## 2017-06-22 RX ADMIN — Medication SCH MLS: at 08:19

## 2017-06-22 NOTE — HOSPPROG
Hospitalist Progress Note


Assessment/Plan: 





* colon cancer status post resection POD #8.  Path confirms invasive 

adenocarcinoma.  Surgery to discuss path results.  Will need oncology consult.  

Less distention today.  Moving bowels, which are reportedly watery, c diff neg.

  Cont to advance diet, ambulate.  Discussed with Dr. Zapata.





* ileus - improved.  encouraged ambulation.





* history of massive PE in setting of previous uterine cancer several years ago


*  cont xarelto





* iron deficiency anemia/acute blood loss anemia


*  status post 3 doses of IV iron





* nutrition


* taking clears, advance diet as tolerated





* chest pain several days ago


* Troponins are negative


* Chest pain is resolved


* Possibly musculoskeletal or GERD





* dispo - cont inpt, likely home by weekend.





* full code





Subjective: Pt doing great.  She is tolerating clears, plans to advance diet.  +

BM yesterday.  No pain, no N/V.  No fevers.  Wants to go  home.


Objective: 


 Vital Signs











Temp Pulse Resp BP Pulse Ox


 


 36.8 C   71   18   126/75 H  93 


 


 06/22/17 07:36  06/22/17 07:36  06/22/17 07:36  06/22/17 07:36  06/22/17 07:36








 Laboratory Results





 06/21/17 04:39 





 06/20/17 04:40 





 











 06/21/17 06/22/17 06/23/17





 05:59 05:59 05:59


 


Intake Total 2820 200 


 


Output Total 1600 2750 


 


Balance 1220 -2550 














- Physical Exam


Constitutional: no apparent distress


Eyes: PERRL


Ears, Nose, Mouth, Throat: moist mucous membranes


Cardiovascular: regular rate and rhythym


Respiratory: no respiratory distress


Gastrointestinal: normoactive bowel sounds, soft, non-tender abdomen


Skin: warm


Musculoskeletal: full muscle strength


Neurologic: AAOx3


Psychiatric: interacting appropriately





ICD10 Worksheet


Patient Problems: 


 Problems











Problem Status Onset


 


Colon adenocarcinoma Acute

## 2017-06-22 NOTE — SOAPPROG
SOAP Progress Note


Assessment/Plan: 


Assessment/Plan:


POD#8 status post left colectomy for obstructing colon cancer





 





 06/16/17 04:53 





 06/16/17 04:53 





 











Patient ABO/Rh  A POSITIVE   06/14/17  08:10    


 


Total Bilirubin  2.0 mg/dL (0.1-1.4)  H  06/16/17  04:53    


 


AST  24 IU/L (14-46)   06/16/17  04:53    


 


ALT  25 IU/L (9-52)   06/16/17  04:53    











No complaints tylenol for pain


No more nausea/emesis


Liquid stool


VSS





Regular rate and rhythm


Clear to auscultation bilaterally


Abdomen is soft mildly distended incision clean dry and intact no signs of 

infection or herniation


Extremities without edema








Xarelto restarted


Appreciate  Medicine consult


Ileus resolved


Adv diet 


Anticipate d/c in 24-48 hr


Continue to monitor for now.  Encourage ambulation








06/18/17 17:54





06/19/17 17:44





06/20/17 16:27





06/22/17 10:40





Objective: 





 Vital Signs











Temp Pulse Resp BP Pulse Ox


 


 36.8 C   71   18   126/75 H  93 


 


 06/22/17 07:36  06/22/17 07:36  06/22/17 07:36  06/22/17 07:36  06/22/17 07:36








 Laboratory Results





 06/21/17 04:39 





 06/20/17 04:40 





 











 06/21/17 06/22/17 06/23/17





 05:59 05:59 05:59


 


Intake Total 2820 200 


 


Output Total 1600 2750 


 


Balance 1220 -2550 














ICD10 Worksheet


Patient Problems: 


 Problems











Problem Status Onset


 


Colon adenocarcinoma Acute  














- ICD10 Problem Qualifiers


(1) Colon adenocarcinoma

## 2017-06-23 RX ADMIN — Medication SCH MLS: at 09:16

## 2017-06-23 RX ADMIN — RIVAROXABAN SCH MG: 20 TABLET, FILM COATED ORAL at 18:51

## 2017-06-23 RX ADMIN — PSYLLIUM HUSK SCH: 3.4 GRANULE ORAL at 09:17

## 2017-06-23 RX ADMIN — Medication SCH MLS: at 20:08

## 2017-06-23 NOTE — HOSPPROG
Hospitalist Progress Note


Assessment/Plan: 





* colon cancer status post resection POD #9.  Path confirms invasive 

adenocarcinoma.  Surgery discussed path results and will arrange oncology 

consult.  She is tolerating a full diet and is moving bowels.  Cont to advance 

diet, ambulate.  Home per surgery.





* ileus - improved.  Ambulating.





* history of massive PE in setting of previous uterine cancer several years ago


*  cont xarelto





* iron deficiency anemia/acute blood loss anemia - hgb stable


*  status post 3 doses of IV iron





* nutrition


* tolerating full diet





* chest pain several days ago


* Troponins are negative


* Chest pain is resolved


* Possibly musculoskeletal or GERD





* dispo - cont inpt, likely home by weekend, d/c per surg.





* full code





Subjective: Pt feels great.  Denies pain.  Tolerating full diet.  Ambulating.  

Wants to go home.


Objective: 


 Vital Signs











Temp Pulse Resp BP Pulse Ox


 


 36.7 C   67   16   113/63   94 


 


 06/23/17 07:56  06/23/17 07:56  06/23/17 07:56  06/23/17 07:56  06/23/17 07:56








 Laboratory Results





 06/21/17 04:39 





 06/20/17 04:40 





 











 06/22/17 06/23/17 06/24/17





 05:59 05:59 05:59


 


Intake Total 200 850 0


 


Output Total 2750  


 


Balance -2550 850 0














- Physical Exam


Constitutional: no apparent distress


Eyes: PERRL


Ears, Nose, Mouth, Throat: moist mucous membranes


Cardiovascular: regular rate and rhythym


Respiratory: no respiratory distress, clear to auscultation


Gastrointestinal: normoactive bowel sounds, soft, non-tender abdomen


Skin: warm


Musculoskeletal: full muscle strength


Neurologic: AAOx3


Psychiatric: interacting appropriately





ICD10 Worksheet


Patient Problems: 


 Problems











Problem Status Onset


 


Colon adenocarcinoma Acute

## 2017-06-23 NOTE — SOAPPROG
SOAP Progress Note


Assessment/Plan: 


Assessment/Plan:


POD#8 status post left colectomy for obstructing colon cancer





 





 06/16/17 04:53 





 06/16/17 04:53 





 











Patient ABO/Rh  A POSITIVE   06/14/17  08:10    


 


Total Bilirubin  2.0 mg/dL (0.1-1.4)  H  06/16/17  04:53    


 


AST  24 IU/L (14-46)   06/16/17  04:53    


 


ALT  25 IU/L (9-52)   06/16/17  04:53    











Tolerating regular diet


Pain controlled


OOB in halls


Pathology results given





VSS





Regular rate and rhythm


Clear to auscultation bilaterally


Abdomen is soft mildly distended incision clean dry and intact no signs of 

infection or herniation


Extremities without edema








Xarelto


Appreciate  Medicine consult


Ileus resolved


D/c in am


Present at tumor board this upcoming Tuesday


Continue to monitor for now.  Encourage ambulation








06/18/17 17:54





06/19/17 17:44





06/20/17 16:27





06/22/17 10:40





06/23/17 18:41





Objective: 





 Vital Signs











Temp Pulse Resp BP Pulse Ox


 


 36.9 C   72   16   129/89 H  92 


 


 06/23/17 16:00  06/23/17 16:00  06/23/17 16:00  06/23/17 16:00  06/23/17 16:00








 Laboratory Results





 06/21/17 04:39 





 06/20/17 04:40 





 











 06/22/17 06/23/17 06/24/17





 05:59 05:59 05:59


 


Intake Total 200 850 0


 


Output Total 2750  


 


Balance -2550 850 0














ICD10 Worksheet


Patient Problems: 


 Problems











Problem Status Onset


 


Colon adenocarcinoma Acute  














- ICD10 Problem Qualifiers


(1) Colon adenocarcinoma

## 2017-06-24 VITALS
RESPIRATION RATE: 14 BRPM | OXYGEN SATURATION: 90 % | HEART RATE: 63 BPM | DIASTOLIC BLOOD PRESSURE: 84 MMHG | TEMPERATURE: 98.3 F | SYSTOLIC BLOOD PRESSURE: 125 MMHG

## 2017-06-24 RX ADMIN — Medication SCH: at 08:55

## 2017-06-24 RX ADMIN — PSYLLIUM HUSK SCH: 3.4 GRANULE ORAL at 08:55

## 2017-06-27 NOTE — PDDCSUM
Discharge Summary


Discharge Summary: 





Wendy Jeter is a 57-year-old woman who presented for urgent colectomy for 

splenic colon cancer.  She was found to have a colonic mass on screening 

colonoscopy by Dr. Jm mayo.  The patient was subsequently seen by General 

surgery, underwent CT scan which did not show any metastatic disease but a 

large 11 cm splenic flexure mass.





She presented to the hospital for colectomy underwent laparoscopic to open left 

colectomy with low anterior transverse proctosotomy.  Postoperative ileus 

resolved without NG tube decompression.  She was noted to have mild ascites but 

no sign of colonic leak. She had anemia of chronic disease microcytic prior to 

admission and was given 2 units of blood for acute blood loss anemia associated 

with symptomatic hypoxia.





Past medical history significant for massive pulmonary embolus, uterine cancer.





Past surgical history includes hysterectomy, tracheostomy, peg tube placement





Past hospitalizations include pulmonary embolus with cardiopulmonary arrest in 

coma in 2014





No known drug allergies


Medications at home include Xarelto 20 mg daily





Social history the patient denies drinking or tobacco use.  She is  and 

employed as a 





On discharge the patient's exam is as follows:


Alert oriented to person place and time


Regular rate and rhythm


Clear to auscultation bilaterally


Abdomen is soft nondistended incision clean dry and intact


No peripheral edema








Impression:  Doing well postoperatively without significant complications after 

left colectomy for splenic flexure cancer.  Twenty-eight nodes were negative 

for cancer.  Pathologic staging of T4 be due to extension into pericolonic 

omentum





Plan:  Outpatient medical oncology evaluation.  Follow up in the office.  Call 

with any questions regarding surgery. She had restarted her Xarelto in the 

hospital and will continue that as an outpatient.  Discussion will be had at 

multidisciplinary rounds 1 week from discharge.  The patient will call with 

concerns such as temperature greater than 101.5, inability eat, pain not 

controlled by medication, cardiopulmonary problems or other issues she thinks 

to be related to her hospitalization.  All questions were answered.  The 

patient and her  were happy with the plan of care.

## 2019-06-07 ENCOUNTER — HOSPITAL ENCOUNTER (OUTPATIENT)
Dept: HOSPITAL 80 - FIMAGING | Age: 59
End: 2019-06-07
Payer: COMMERCIAL